# Patient Record
(demographics unavailable — no encounter records)

---

## 2024-10-18 NOTE — ASSESSMENT
[FreeTextEntry1] : 50F w/ hx of RA currently treated with prednisone that has 1 year of decreased flexion of the L hand index finger. On exam her FDP and FDS appear intact but limited movement due to PIPJ joint capsule stiffness 2/2 to chronic inflammation and disuse.  No surgical intervention indicated Recommend OHT for joint stiffness  Plan:  - spoke to her at length about decreasing the inflammation in order to prevent a swan neck deformity and progression of RA-related arthropathy. - imaging reviewed -- MRI and xrays indicate MPJ and PIPJ edema. Contrast would be needed to determine active synovitis - recommend hand therapy to assist in movement activities (ROM, strength) - continue seeing her rheumatologist.  - spoke to her about the importance of smoking cessation - all questions were answered - RTC 6 wks for OHT follow up

## 2024-10-18 NOTE — PHYSICAL EXAM
[de-identified] : comfortable appearing [de-identified] : breathing well on room air [de-identified] : Left hand: index finger held in slight PIPJ hyperextension, PIPJ joint stiffness decreased range of motion with Active and passive range of motion. Active ROM very limited (10-20 deg) for PIPJ, and pain with passive movement and inability to fully flex due to pain and joint stiffness. all other digits with full active and passive range motion. Strong radial pulse. Good capillary refill. Sensation intact in all digits.  Right hand: strong radial pulse. Negative grind test. good capillary refill and sensation intact throughout.   No pain in b/l wrists with manipulation.  [de-identified] : no open sores or rashes

## 2024-10-18 NOTE — HISTORY OF PRESENT ILLNESS
[FreeTextEntry1] : 50F RHD w/ hx of Rheumatoid arthritis, asthma that presents for 1 year of an inability to flex her left hand index finger. she states she woke up 1 year ago when she woke up. She states at that time she went to the ED and they took xrays and told her that she just had inflammation. she has since been managing her inability to flex her finger with medications by her rheumatologist. She is currently on steroids and states she has never taken immunologic medications. She is relays no improvement in her index finger, and states that she feels a dull pain in her index finger only when she tries to move it. She is a current every day a smoker 5-6 cigarettes, for the past 30 years. She states that she also has occasional bone pain throughout her body which she correlates with her rheumatoid. She denies any recent fevers, nausea, or diarrhea.   Social hx: lives on [place] with spouse and daughter, currently on disability PMHx: rheumatoid spondyloarthropathy, asthma Srx: gastric bypass (2020, lost >100 pounds)

## 2024-12-06 NOTE — HISTORY OF PRESENT ILLNESS
[FreeTextEntry1] : 50F RHD w/ hx of Rheumatoid arthritis, asthma that presents for 1 year of an inability to flex her left hand index finger. she states she woke up 1 year ago when she woke up. She states at that time she went to the ED and they took xrays and told her that she just had inflammation. she has since been managing her inability to flex her finger with medications by her rheumatologist. She is currently on steroids and states she has never taken immunologic medications. She is relays no improvement in her index finger, and states that she feels a dull pain in her index finger only when she tries to move it. She is a current every day a smoker 5-6 cigarettes, for the past 30 years. She states that she also has occasional bone pain throughout her body which she correlates with her rheumatoid. She denies any recent fevers, nausea, or diarrhea.   Social hx: lives [place] with spouse and daughter, currently on disability PMHx: rheumatoid spondyloarthropathy, asthma Srx: gastric bypass (2020, lost >100 pounds)  Interval hx (12/6/24):  Here for OHT follow up.  Pt started therapy for approximately 2 months.  She is going 2x/week.  Using a dynamic splint to increase flexion ROM.  Anton tape.  Pt needs another Rx to new OHT. pt seeing progress

## 2024-12-06 NOTE — PHYSICAL EXAM
[de-identified] : comfortable appearing [de-identified] : breathing well on room air [de-identified] : Left hand: index finger held in slight PIPJ hyperextension, PIPJ joint stiffness decreased range of motion with Active and passive range of motion. Active ROM very limited (20-30 deg) for PIPJ, and less pain with passive movement and inability to fully flex due to pain and joint stiffness. all other digits with full active and passive range motion. Strong radial pulse. Good capillary refill. Sensation intact in all digits.  Right hand: strong radial pulse. Negative grind test. good capillary refill and sensation intact throughout.   No pain in b/l wrists with manipulation.  [de-identified] : no open sores or rashes

## 2024-12-06 NOTE — ASSESSMENT
[FreeTextEntry1] : 50F w/ hx of RA currently treated with prednisone that has 1 year of decreased flexion of the L hand index finger. On exam her FDP and FDS appear intact but limited movement due to PIPJ joint capsule stiffness 2/2 to chronic inflammation and disuse.  No surgical intervention indicated c/w OHT for joint stiffness before re-eval for possible joint capsule contracture  Plan:  - c/w hand therapy to assist in movement activities (ROM, strength) - Tylenol before OHT  - spoke to her at length about decreasing the inflammation in order to prevent a swan neck deformity and progression of RA-related arthropathy. - continue seeing her rheumatologist.  - spoke to her about the importance of smoking cessation - all questions were answered - RTC 2 months for OHT follow up

## 2025-02-12 NOTE — REASON FOR VISIT
[Follow-Up Visit] : a follow-up visit for [Blood Count Assessment] : blood count assessment [Other: _____] : [unfilled] [FreeTextEntry2] : abnormal sPEP

## 2025-02-12 NOTE — ASSESSMENT
[FreeTextEntry1] : # Abnormal sPEP, noted 07/2023 - Hgb 9.4, M spike 0.4, SPEP pattern demonstrates a single peak M spike in the gamma region which may represent monoclonal protein, immunofixation shows IgG monoclonal protein with kappa light chain specificity monoclonal bands detected are faint.  Suggest retesting in 4 to 6 months.   - IgG 2018  - IgA 494  - IgM 83  - kappa free light chains 2.02, lambda free light chains 2.13, free light chain ratio 0.95  - CRP 16, ESR 26   # Anemia, mild, normocytic; iron deficiency 2/2 poor absorption due to gastric bypass surgery in 2020  - Vitamin B12, folate, MMA normal from 10/2023  - s/p IV Venofer 200mg x 5 doses, completed in 12/2023 with improvement in iron stores  - Labwork today: CBC, BMP, LFTs, iron studies, ferritin, myeloma panel, MMA level   Encouraged to completely abstain from smoking.  We offered smoking cessation referral and patient is agreeable; tasked Social workers for assistance.  RTC in 4 months with CBC, BMP, LFTs, iron studies, ferritin, complete Myeloma panel 1 week prior   seen/ examined w/ NP Diego; note reviewed;case discussed; agree w/ plan

## 2025-02-12 NOTE — HISTORY OF PRESENT ILLNESS
[de-identified] : Ms. RAJ BLACK is a 49 year old female here today for evaluation and management of abnormal sPEP.    RAJ is a 49 year old F with PMHx including asthma, iron deficiency anemia and inflammatory arthritis, who presents to clinic to establish care.   She had gastric bypass surgery in 07/2020 and lost over 100lbs afterward.  She followed with Nutritionist shortly after the surgery but stopped.  She presented to the ER in June 2023 with complaint of joint swelling.  She was subsequently sent to [facility] to rule out rheumatoid arthritis who performed lab workup which showed elevated M-spike on sPEP.  She is presently feeling well with no new complaints.  She reports history of menorrhagia but LMP was over 12 months ago.  Patient denies fever, chills, drenching night sweats, dyspnea, memory changes, unintentional weight loss or bleeding.  Patient reports no known family history of malignancy or blood/clotting disorder.  Current smoker, about 5-6 cigs/day.    LAB WORKUP (8.23.2023) WBC 10.3, Hgb 9.4, MCV 92, , M spike 0.4, SPEP pattern demonstrates a single peak M spike in the gamma region which may represent monoclonal protein, immunofixation shows IgG monoclonal protein with kappa light chain specificity monoclonal bands detected are faint.  Suggest retesting in 4 to 6 months.  Polyclonal increase detected in 1 or more immunoglobulins, IgG 2018, IgA 494, IgM 83, Edison free light chains 2.02, lambda free light chains 2.13, free light chain ratio 0.95,CRP 16, ESR 26, urine protein 155, urine immunofixation shows a faint band noted in The suggestive of free monoclonal protein.  Recommend retesting in 4 to 6 months. (7.28.2023) WBC 9.1, Hgb 8.6, MCV 91, ,Cr 0.79, EGFR 92, calcium 9, normal LFTs, M spike 0.7, SPEP pattern demonstrates a single peak M spike in the gamma region which may represent monoclonal protein.  Immunofixation shows IgG monoclonal protein with kappa light chain specificity polyclonal increase detected in 1 or more immunoglobulins, IgG 2177, IgA 490, IgM 70, ESR 36,CRP 81   HCM Colonoscopy done within 5 years, reportedly normal  Upper Endoscopy done within 5 years, reportedly normal  Mammography overdue  GYN Pelvic Exam/pap done 2023, due again  [de-identified] : 10/31/23 Patient is here for a follow-up visit for anemia.  She is feeling well with no new complaints.  Reviewed most recent CBC, which shows mild anemia and neutrophil-predominant leukocytosis.  She has upcoming evaluation with [facility].  She has not tolerated oral iron in the past.    1/5/24 Patient is here for a follow-up visit for anemia.  She is feeling well with no new complaints.  Patient completed IV Venofer x 5 in 12/2023.  Reviewed most recent CBC, which shows hgb 12.1g/dL; ferritin 184, ironsat 35%, TIBC 279.  She had colonoscopy 3 years ago which was reportedly benign.  Last GYN exam was a year ago and also reportedly normal.    8/1/24 Patient is here for a follow-up visit for anemia.  She is feeling well with no new complaints.  Patient completed IV Venofer x 5 in 12/2023.  Reviewed most recent CBC, which shows hgb 11.7g/dL.  She had colonoscopy 3 years ago which was reportedly benign.  Last GYN exam was a year ago and also reportedly normal.  She takes Methotrexate weekly from [facility] for inflammatory arthritis.      2/12/25 Patient is here for a follow-up visit for anemia.  She is feeling well with no new complaints.  Patient completed IV Venofer x 5 in 12/2023.  Reviewed most recent CBC, which shows hgb 12.1g/dL.  She had colonoscopy around 5 years ago which was reportedly benign.  Last GYN exam was a year ago but she is scheduled for later this week.  She is up to date on mammography which was reportedly normal.  She has not been taking Folic acid nor B12 supplementation.  She is still smoking cigarettes.  She is gaining weight.

## 2025-02-12 NOTE — REVIEW OF SYSTEMS
[Diarrhea: Grade 0] : Diarrhea: Grade 0 [Joint Pain] : joint pain [Negative] : Allergic/Immunologic [Recent Change In Weight] : ~T recent weight change [FreeTextEntry2] : gaining weight  [FreeTextEntry9] : reports discomfort related to inflammation of finger and L knee/ankle/toes since Summer 2023, following with [facility]

## 2025-02-12 NOTE — CONSULT LETTER
[Dear  ___] : Dear  [unfilled], [Please see my note below.] : Please see my note below. [Sincerely,] : Sincerely, [DrReese  ___] : Dr. SCHAEFER [FreeTextEntry3] : Jaswinder Schwartz NP

## 2025-03-21 NOTE — HISTORY OF PRESENT ILLNESS
[FreeTextEntry1] : 50F RHD w/ hx of Rheumatoid arthritis, asthma that presents for 1 year of an inability to flex her left hand index finger. she states she woke up 1 year ago when she woke up. She states at that time she went to the ED and they took xrays and told her that she just had inflammation. she has since been managing her inability to flex her finger with medications by her rheumatologist. She is currently on steroids and states she has never taken immunologic medications. She is relays no improvement in her index finger, and states that she feels a dull pain in her index finger only when she tries to move it. She is a current every day a smoker 5-6 cigarettes, for the past 30 years. She states that she also has occasional bone pain throughout her body which she correlates with her rheumatoid. She denies any recent fevers, nausea, or diarrhea.   Social hx: lives [place] with spouse and daughter, currently on disability PMHx: rheumatoid spondyloarthropathy, asthma Srx: gastric bypass (2020, lost >100 pounds)  Interval hx (12/6/24):  Here for OHT follow up.  Pt started therapy for approximately 2 months.  She is going 2x/week.  Using a dynamic splint to increase flexion ROM.  Anton tape.  Pt needs another Rx to new OHT. pt seeing progress  interval hx (3/21/25):  here for OHT follow up.  Was recently DC'ed from prior Hand therapist for missing 2 consecutive appts.  She has another hand therapist appt with another provide next week.  Reports resuming smoking bc of death in the family and related stress.  She has noted improvement of LIF ROM with hand therapy.

## 2025-03-21 NOTE — ASSESSMENT
[FreeTextEntry1] : 50F w/ hx of RA currently treated with prednisone that has 1 year of decreased flexion of the L hand index finger. On exam her FDP and FDS appear intact but limited movement due to PIPJ joint capsule stiffness 2/2 to chronic inflammation and disuse.  No surgical intervention indicated c/w OHT for joint stiffness before re-eval for possible joint capsule contracture resume OHT with new provider ([facility])  Plan:  - c/w hand therapy to assist in movement activities (ROM, strength) - Tylenol before OHT  - spoke to her at length about decreasing the inflammation in order to prevent a swan neck deformity and progression of RA-related arthropathy. - continue seeing her rheumatologist for RA control/mgmt - spoke to her about the importance of resuming smoking cessation - all questions were answered - RTC 6 weeks  for OHT follow up

## 2025-03-21 NOTE — PHYSICAL EXAM
[de-identified] : comfortable appearing [de-identified] : breathing well on room air [de-identified] : Left hand: index finger held in slight PIPJ hyperextension, PIPJ joint stiffness decreased range of motion, decreased AROM and PROM. Active ROM very limited (30 deg) for PIPJ, 90 deg on PROM and less pain with passive movement and inability to fully flex due to pain and joint stiffness. all other digits with full active and passive range motion. Strong radial pulse. Good capillary refill. Sensation intact in all digits.  Right hand: strong radial pulse. Negative grind test. good capillary refill and sensation intact throughout.   No pain in b/l wrists with manipulation.  [de-identified] : no open sores or rashes

## 2025-05-02 NOTE — PHYSICAL EXAM
[de-identified] : comfortable appearing [de-identified] : breathing well on room air [de-identified] : Left hand: index finger held in slight PIPJ hyperextension, PIPJ joint stiffness decreased range of motion improving with HER; Active ROM and PROM limited (90 deg) for PIPJ, lesspain with passive movement and inability to fully flex into composite fist due to pain and joint stiffness. all other digits with full active and passive range motion. Strong radial pulse. Good capillary refill. Sensation intact in all digits.  Right hand: strong radial pulse. Negative grind test. good capillary refill and sensation intact throughout.   No pain in b/l wrists with manipulation.  [de-identified] : no open sores or rashes

## 2025-05-02 NOTE — ASSESSMENT
[FreeTextEntry1] : 50F w/ hx of RA currently treated with prednisone that has 1 year of decreased flexion of the L hand index finger. On exam her FDP and FDS appear intact but limited movement due to PIPJ joint capsule stiffness 2/2 to chronic inflammation and disuse.  No surgical intervention indicated c/w OHT for joint stiffness before re-eval for possible joint capsule contracture resume OHT with new provider ([facility]) pending new consultation  Plan:  - c/w hand therapy to assist in movement activities (ROM, strength) - reassurance given; she has made her own progress; still recommend left hand OHT - Tylenol before OHT  - spoke to her at length about decreasing the inflammation in order to prevent a swan neck deformity and progression of RA-related arthropathy. - continue seeing her rheumatologist for RA control/mgmt and X-ray follow up - spoke to her about the importance of resuming smoking cessation - all questions were answered - RTC 8-10 weeks  for OHT follow up